# Patient Record
Sex: FEMALE | NOT HISPANIC OR LATINO | Employment: FULL TIME
[De-identification: names, ages, dates, MRNs, and addresses within clinical notes are randomized per-mention and may not be internally consistent; named-entity substitution may affect disease eponyms.]

---

## 2017-06-24 ENCOUNTER — HEALTH MAINTENANCE LETTER (OUTPATIENT)
Age: 47
End: 2017-06-24

## 2018-03-09 ENCOUNTER — TRANSFERRED RECORDS (OUTPATIENT)
Dept: HEALTH INFORMATION MANAGEMENT | Facility: CLINIC | Age: 48
End: 2018-03-09

## 2019-09-25 ENCOUNTER — TELEPHONE (OUTPATIENT)
Dept: FAMILY MEDICINE | Facility: CLINIC | Age: 49
End: 2019-09-25

## 2019-09-25 ENCOUNTER — NURSE TRIAGE (OUTPATIENT)
Dept: NURSING | Facility: CLINIC | Age: 49
End: 2019-09-25

## 2019-09-25 NOTE — TELEPHONE ENCOUNTER
Carmel has had an IUD in place since 10/2014. She understood it was good for five years. She called today asking about having this one removed and a new one placed.    1) Is this something that can be done in one visit?    She's due for her pap. She thinks she had one done a year ago at [facility].    Call Carmel, [phone], about above question.  She'd like to make one or two if needed appointment(s).    She'd like a detailed message left on her voicemail if she doesn't answer.    Routed: P [id number] Dr Kemal HEIN RN [facility]       Close encounter once addressed

## 2019-09-25 NOTE — TELEPHONE ENCOUNTER
Carmel has had an IUD in place since 10/2014. She understood it was good for five years. She called today asking about having this one removed and a new one placed.    1) Is this something that can be done in one visit?    She's due for her pap. She thinks she had one done a year ago at [facility].    Call Carmel, [phone], about above question.  She'd like to make one or two if needed appointment(s).    She'd like a detailed message left on her voicemail if she doesn't answer.    Routed: P [id number] Dr Kemal HEIN RN [facility]       Close encounter once addressed

## 2019-09-25 NOTE — TELEPHONE ENCOUNTER
Called patient back, she will call the place where she had her pap smear done and have them transfer over her pap smear results. Gave patient EP main fax number. If normal, patient will not need to do a pap smear. Otherwise scheduled patient to IUD removal and reinsertion next Tuesday with MD Sommer. Patient verbalized understanding and agrees with plan.     Breanne Brumfield RN, BSN  [facility] [place]

## 2019-09-25 NOTE — TELEPHONE ENCOUNTER
If the  Pap smear was done last year, please have her get her Pap smear results transferred to me for review.  A Pap smear was normal, she would not need a repeat Pap smear at this time    Please schedule her for IUD removal and reinsertion for 20 minutes appointment    Sudhakar Sommer MD,MD  [facility] [place]

## 2019-09-25 NOTE — TELEPHONE ENCOUNTER
Patient is calling and had an IUD in place since 10/2014. She was told it should be good for five years. She is calling asking about having her IUD removed and a new one placed, but wants to know if she can get a PAP done along with her IUD removal in the same appointment. Patient did say she got her pap done a year ago at [facility].     1) If patient stated that she got her pap done a year ago at [place], do you want patient to still come in for pap?    2) If patient needs pap, are you able to do pap and IUD removal and place a new IUD in 1 office visit, or does patient need to schedule 2 office visit for this.     Routing to PCP to advise. Thank you.    Breanne Brumfield RN, BSN  [place]

## 2022-08-08 NOTE — PROGRESS NOTES
[facility]  [address]  [address]  [facility] [address]  Phone: [phone]  Fax: [phone]  Primary Provider: Sudhakar Sommer  Pre-op Performing Provider: VIRGINIA SIMMONS      PREOPERATIVE EVALUATION:  Today's date: 8/9/2022    Carmel Bowman is a 51 year old female who presents for a preoperative evaluation.    Surgical Information:  Surgery/Procedure: left wrist repair  Surgery Location: [facility]  Surgeon: Dr. Lyles  Surgery Date: 8/10/2022  Time of Surgery: 5 josie  Where patient plans to recover: At home with family  Fax number for surgical facility: [phone]    Type of Anesthesia Anticipated: to be determined    Assessment & Plan     The proposed surgical procedure is considered INTERMEDIATE risk.    Health Care Home    ACP (advance care planning)    Pre-op exam  Proceed with surgery at surgeon's discretion  - COVID-19 ([facility])    Wrist fracture, left, closed, initial encounter    - COVID-19 ([facility])          Risks and Recommendations:  The patient has the following additional risks and recommendations for perioperative complications:   - No identified additional risk factors other than previously addressed    Medication Instructions:  Patient is on no chronic medications    RECOMMENDATION:  APPROVAL GIVEN to proceed with proposed procedure, without further diagnostic evaluation.  Subjective     HPI related to upcoming procedure:     1. No - Have you ever had a heart attack or stroke?  2. No - Have you ever had surgery on your heart or blood vessels, such as a stent, coronary (heart) bypass, or surgery on an artery in the head, neck, heart, or legs?  3. No - Do you have chest pain when you are physically active?  4. No - Do you have a history of heart failure?  5. No - Do you currently have a cold, bronchitis, or symptoms of other respiratory (head and chest) infections?  6. No - Do you have a cough, shortness of breath, or wheezing?  7. No - Do you or anyone in  your family have a history of blood clots?  8. No - Do you or anyone in your family have a serious bleeding problem, such as long-lasting bleeding after surgeries or cuts?  9. No - Have you ever had anemia or been told to take iron pills?  10. No - Have you had any abnormal blood loss such as black, tarry or bloody stools, or abnormal vaginal bleeding?  11. No - Have you ever had a blood transfusion?  12. Yes - Are you willing to have a blood transfusion if it is medically needed before, during, or after your surgery?  13. No - Have you or anyone in your family ever had problems with anesthesia (sedation for surgery)?  14. No - Do you have sleep apnea, excessive snoring, or daytime drowsiness?   15. No - Do you have any artifical heart valves or other implanted medical devices, such as a pacemaker, defibrillator, or continuous glucose monitor?  16. No - Do you have any artifical joints?  17. No - Are you allergic to latex?  18. No - Is there any chance that you may be pregnant?    Preoperative Review of :   reviewed - controlled substances prescribed by other outside provider(s).      Status of Chronic Conditions:  See problem list for active medical problems.  Problems all longstanding and stable, except as noted/documented.  See ROS for pertinent symptoms related to these conditions.      Review of Systems  CONSTITUTIONAL: NEGATIVE for fever, chills, change in weight  INTEGUMENTARY/SKIN: NEGATIVE for worrisome rashes, moles or lesions  EYES: NEGATIVE for vision changes or irritation  ENT/MOUTH: NEGATIVE for ear, mouth and throat problems  RESP: NEGATIVE for significant cough or SOB  CV: NEGATIVE for chest pain, palpitations or peripheral edema  GI: NEGATIVE for nausea, abdominal pain, heartburn, or change in bowel habits  : NEGATIVE for frequency, dysuria, or hematuria  NEURO: NEGATIVE for weakness, dizziness or paresthesias  ENDOCRINE: NEGATIVE for temperature intolerance, skin/hair changes  HEME:  "NEGATIVE for bleeding problems  PSYCHIATRIC: NEGATIVE for changes in mood or affect    Patient Active Problem List    Diagnosis Date Noted     IUD (intrauterine device) in place 10/03/2014     Priority: Medium     Mirena       Chronic headaches 2014     Priority: Medium     Insomnia 2014     Priority: Medium     Family history of diabetes mellitus (DM) 2014     Priority: Medium     Health Care Home 2022     Priority: Low      Past Medical History:   Diagnosis Date     IUD (intrauterine device) in place 10/3/14    Mirena     Past Surgical History:   Procedure Laterality Date      SECTION           No current outpatient medications on file.       No Known Allergies     Social History     Tobacco Use     Smoking status: Never Smoker     Smokeless tobacco: Never Used   Substance Use Topics     Alcohol use: No       History   Drug Use No         Objective     /80 (BP Location: Right arm, Patient Position: Sitting, Cuff Size: Adult Regular)   Pulse 74   Temp 97.2  F (36.2  C) (Temporal)   Ht 1.651 m (5' 5\")   Wt 56.7 kg (125 lb)   SpO2 98%   BMI 20.80 kg/m      Physical Exam    GENERAL APPEARANCE: healthy, alert and no distress     EYES: EOMI, PERRL     HENT: ear canals and TM's normal and nose and mouth without ulcers or lesions     NECK: no adenopathy, no asymmetry, masses, or scars and thyroid normal to palpation     RESP: lungs clear to auscultation - no rales, rhonchi or wheezes     CV: regular rates and rhythm, normal S1 S2, no S3 or S4 and no murmur, click or rub     ABDOMEN:  soft, nontender, no HSM or masses and bowel sounds normal     MS: extremities normal- no gross deformities noted, no evidence of inflammation in joints, FROM in all extremities.     SKIN: no suspicious lesions or rashes     NEURO: Normal strength and tone, sensory exam grossly normal, mentation intact and speech normal     PSYCH: mentation appears normal. and affect normal/bright     LYMPHATICS: " No cervical adenopathy    No results for input(s): HGB, PLT, INR, NA, POTASSIUM, CR, A1C in the last 72417 hours.     Diagnostics:     Status: Final result     Visible to patient: No (scheduled for 8/9/2022  1:54 PM)     Dx: Pre-op exam; Wrist fracture, left, cl...     0 Result Notes    Component 12:54 PM    COVID-19 Negative    Resulting Agency [facility]              Specimen Collected: 08/09/22 12:54 PM Last Resulted: 08/09/22 12:54 PM                Patient had recent labs at outside provider (unavailable through Epic) which she states were normal. Based on this with no comorbid conditions patient declines further testing at this time.    No EKG required, no history of coronary heart disease, significant arrhythmia, peripheral arterial disease or other structural heart disease.    Revised Cardiac Risk Index (RCRI):  The patient has the following serious cardiovascular risks for perioperative complications:   - No serious cardiac risks = 0 points     RCRI Interpretation: 0 points: Class I (very low risk - 0.4% complication rate)           Signed Electronically by: Travon Morgan PA-C  Copy of this evaluation report is provided to requesting physician.

## 2022-08-09 ENCOUNTER — OFFICE VISIT (OUTPATIENT)
Dept: FAMILY MEDICINE | Facility: CLINIC | Age: 52
End: 2022-08-09

## 2022-08-09 VITALS
BODY MASS INDEX: 20.83 KG/M2 | WEIGHT: 125 LBS | OXYGEN SATURATION: 98 % | TEMPERATURE: 97.2 F | HEART RATE: 74 BPM | SYSTOLIC BLOOD PRESSURE: 116 MMHG | DIASTOLIC BLOOD PRESSURE: 80 MMHG | HEIGHT: 65 IN

## 2022-08-09 DIAGNOSIS — Z01.818 PRE-OP EXAM: ICD-10-CM

## 2022-08-09 DIAGNOSIS — Z71.89 ACP (ADVANCE CARE PLANNING): Primary | ICD-10-CM

## 2022-08-09 DIAGNOSIS — Z76.89 HEALTH CARE HOME: ICD-10-CM

## 2022-08-09 DIAGNOSIS — S62.102A WRIST FRACTURE, LEFT, CLOSED, INITIAL ENCOUNTER: ICD-10-CM

## 2022-08-09 LAB — COVID-19: NEGATIVE

## 2022-08-09 PROCEDURE — 99204 OFFICE O/P NEW MOD 45 MIN: CPT | Performed by: PHYSICIAN ASSISTANT

## 2022-08-09 PROCEDURE — G2023 SPECIMEN COLLECT COVID-19: HCPCS | Performed by: PHYSICIAN ASSISTANT

## 2022-08-09 PROCEDURE — 87635 SARS-COV-2 COVID-19 AMP PRB: CPT | Performed by: PHYSICIAN ASSISTANT

## 2022-08-09 NOTE — LETTER
[facility]  [address]  [address]  [facility] [address]  Phone: [phone]  Fax: [phone]  Primary Provider: Sudhakar Sommer  Pre-op Performing Provider: VIRGINIA SIMMONS      PREOPERATIVE EVALUATION:  Today's date: 8/9/2022    Carmel Bowman is a 51 year old female who presents for a preoperative evaluation.    Surgical Information:  Surgery/Procedure: left wrist repair  Surgery Location: [facility] [place]  Surgeon: Dr. Lyles  Surgery Date: 8/10/2022  Time of Surgery: 5 josie  Where patient plans to recover: At home with family  Fax number for surgical facility: [phone]    Type of Anesthesia Anticipated: to be determined    Assessment & Plan     The proposed surgical procedure is considered INTERMEDIATE risk.    Health Care Home    ACP (advance care planning)    Pre-op exam  Proceed with surgery at surgeon's discretion  - COVID-19 ([facility])    Wrist fracture, left, closed, initial encounter    - COVID-19 ([facility])          Risks and Recommendations:  The patient has the following additional risks and recommendations for perioperative complications:   - No identified additional risk factors other than previously addressed    Medication Instructions:  Patient is on no chronic medications    RECOMMENDATION:  APPROVAL GIVEN to proceed with proposed procedure, without further diagnostic evaluation.  Subjective     HPI related to upcoming procedure:     1. No - Have you ever had a heart attack or stroke?  2. No - Have you ever had surgery on your heart or blood vessels, such as a stent, coronary (heart) bypass, or surgery on an artery in the head, neck, heart, or legs?  3. No - Do you have chest pain when you are physically active?  4. No - Do you have a history of heart failure?  5. No - Do you currently have a cold, bronchitis, or symptoms of other respiratory (head and chest) infections?  6. No - Do you have a cough, shortness of breath, or wheezing?  7. No - Do you or anyone in  your family have a history of blood clots?  8. No - Do you or anyone in your family have a serious bleeding problem, such as long-lasting bleeding after surgeries or cuts?  9. No - Have you ever had anemia or been told to take iron pills?  10. No - Have you had any abnormal blood loss such as black, tarry or bloody stools, or abnormal vaginal bleeding?  11. No - Have you ever had a blood transfusion?  12. Yes - Are you willing to have a blood transfusion if it is medically needed before, during, or after your surgery?  13. No - Have you or anyone in your family ever had problems with anesthesia (sedation for surgery)?  14. No - Do you have sleep apnea, excessive snoring, or daytime drowsiness?   15. No - Do you have any artifical heart valves or other implanted medical devices, such as a pacemaker, defibrillator, or continuous glucose monitor?  16. No - Do you have any artifical joints?  17. No - Are you allergic to latex?  18. No - Is there any chance that you may be pregnant?    Preoperative Review of :   reviewed - controlled substances prescribed by other outside provider(s).      Status of Chronic Conditions:  See problem list for active medical problems.  Problems all longstanding and stable, except as noted/documented.  See ROS for pertinent symptoms related to these conditions.      Review of Systems  CONSTITUTIONAL: NEGATIVE for fever, chills, change in weight  INTEGUMENTARY/SKIN: NEGATIVE for worrisome rashes, moles or lesions  EYES: NEGATIVE for vision changes or irritation  ENT/MOUTH: NEGATIVE for ear, mouth and throat problems  RESP: NEGATIVE for significant cough or SOB  CV: NEGATIVE for chest pain, palpitations or peripheral edema  GI: NEGATIVE for nausea, abdominal pain, heartburn, or change in bowel habits  : NEGATIVE for frequency, dysuria, or hematuria  NEURO: NEGATIVE for weakness, dizziness or paresthesias  ENDOCRINE: NEGATIVE for temperature intolerance, skin/hair changes  HEME:  "NEGATIVE for bleeding problems  PSYCHIATRIC: NEGATIVE for changes in mood or affect    Patient Active Problem List    Diagnosis Date Noted     IUD (intrauterine device) in place 10/03/2014     Priority: Medium     Mirena       Chronic headaches 2014     Priority: Medium     Insomnia 2014     Priority: Medium     Family history of diabetes mellitus (DM) 2014     Priority: Medium     Health Care Home 2022     Priority: Low      Past Medical History:   Diagnosis Date     IUD (intrauterine device) in place 10/3/14    Mirena     Past Surgical History:   Procedure Laterality Date      SECTION           No current outpatient medications on file.       No Known Allergies     Social History     Tobacco Use     Smoking status: Never Smoker     Smokeless tobacco: Never Used   Substance Use Topics     Alcohol use: No       History   Drug Use No         Objective     /80 (BP Location: Right arm, Patient Position: Sitting, Cuff Size: Adult Regular)   Pulse 74   Temp 97.2  F (36.2  C) (Temporal)   Ht 1.651 m (5' 5\")   Wt 56.7 kg (125 lb)   SpO2 98%   BMI 20.80 kg/m      Physical Exam    GENERAL APPEARANCE: healthy, alert and no distress     EYES: EOMI, PERRL     HENT: ear canals and TM's normal and nose and mouth without ulcers or lesions     NECK: no adenopathy, no asymmetry, masses, or scars and thyroid normal to palpation     RESP: lungs clear to auscultation - no rales, rhonchi or wheezes     CV: regular rates and rhythm, normal S1 S2, no S3 or S4 and no murmur, click or rub     ABDOMEN:  soft, nontender, no HSM or masses and bowel sounds normal     MS: extremities normal- no gross deformities noted, no evidence of inflammation in joints, FROM in all extremities.     SKIN: no suspicious lesions or rashes     NEURO: Normal strength and tone, sensory exam grossly normal, mentation intact and speech normal     PSYCH: mentation appears normal. and affect normal/bright     LYMPHATICS: " No cervical adenopathy    No results for input(s): HGB, PLT, INR, NA, POTASSIUM, CR, A1C in the last 88639 hours.     Diagnostics:     Status: Final result     Visible to patient: No (scheduled for 8/9/2022  1:54 PM)     Dx: Pre-op exam; Wrist fracture, left, cl...     0 Result Notes    Component 12:54 PM    COVID-19 Negative    Resulting Agency [facility]              Specimen Collected: 08/09/22 12:54 PM Last Resulted: 08/09/22 12:54 PM                Patient had recent labs at outside provider (unavailable through Epic) which she states were normal. Based on this with no comorbid conditions patient declines further testing at this time.    No EKG required, no history of coronary heart disease, significant arrhythmia, peripheral arterial disease or other structural heart disease.    Revised Cardiac Risk Index (RCRI):  The patient has the following serious cardiovascular risks for perioperative complications:   - No serious cardiac risks = 0 points     RCRI Interpretation: 0 points: Class I (very low risk - 0.4% complication rate)           Signed Electronically by: Travon Morgan PA-C  Copy of this evaluation report is provided to requesting physician.

## 2022-10-23 ENCOUNTER — HEALTH MAINTENANCE LETTER (OUTPATIENT)
Age: 52
End: 2022-10-23

## 2022-12-10 ENCOUNTER — HEALTH MAINTENANCE LETTER (OUTPATIENT)
Age: 52
End: 2022-12-10

## 2023-11-05 ENCOUNTER — HEALTH MAINTENANCE LETTER (OUTPATIENT)
Age: 53
End: 2023-11-05

## 2023-12-22 ENCOUNTER — LAB REQUISITION (OUTPATIENT)
Dept: LAB | Facility: CLINIC | Age: 53
End: 2023-12-22

## 2023-12-22 DIAGNOSIS — Z13.220 ENCOUNTER FOR SCREENING FOR LIPOID DISORDERS: ICD-10-CM

## 2023-12-22 DIAGNOSIS — Z13.1 ENCOUNTER FOR SCREENING FOR DIABETES MELLITUS: ICD-10-CM

## 2023-12-22 DIAGNOSIS — Z30.431 ENCOUNTER FOR ROUTINE CHECKING OF INTRAUTERINE CONTRACEPTIVE DEVICE: ICD-10-CM

## 2023-12-22 DIAGNOSIS — Z13.29 ENCOUNTER FOR SCREENING FOR OTHER SUSPECTED ENDOCRINE DISORDER: ICD-10-CM

## 2023-12-22 LAB
HBA1C MFR BLD: 5.5 %
HOLD SPECIMEN: NORMAL
HOLD SPECIMEN: NORMAL

## 2023-12-22 PROCEDURE — 83036 HEMOGLOBIN GLYCOSYLATED A1C: CPT | Performed by: OBSTETRICS & GYNECOLOGY

## 2023-12-22 PROCEDURE — 84443 ASSAY THYROID STIM HORMONE: CPT | Performed by: OBSTETRICS & GYNECOLOGY

## 2023-12-22 PROCEDURE — 80061 LIPID PANEL: CPT | Performed by: OBSTETRICS & GYNECOLOGY

## 2023-12-22 PROCEDURE — 83001 ASSAY OF GONADOTROPIN (FSH): CPT | Performed by: OBSTETRICS & GYNECOLOGY

## 2023-12-23 LAB
CHOLEST SERPL-MCNC: 170 MG/DL
FASTING STATUS PATIENT QL REPORTED: NORMAL
FSH SERPL IRP2-ACNC: 68.2 MIU/ML
HDLC SERPL-MCNC: 84 MG/DL
LDLC SERPL CALC-MCNC: 75 MG/DL
NONHDLC SERPL-MCNC: 86 MG/DL
TRIGL SERPL-MCNC: 55 MG/DL
TSH SERPL DL<=0.005 MIU/L-ACNC: 1.7 UIU/ML (ref 0.3–4.2)

## 2024-01-14 ENCOUNTER — HEALTH MAINTENANCE LETTER (OUTPATIENT)
Age: 54
End: 2024-01-14

## 2024-06-17 PROBLEM — Z76.89 HEALTH CARE HOME: Status: RESOLVED | Noted: 2022-08-09 | Resolved: 2024-06-17

## 2025-01-26 ENCOUNTER — HEALTH MAINTENANCE LETTER (OUTPATIENT)
Age: 55
End: 2025-01-26